# Patient Record
Sex: FEMALE | URBAN - METROPOLITAN AREA
[De-identification: names, ages, dates, MRNs, and addresses within clinical notes are randomized per-mention and may not be internally consistent; named-entity substitution may affect disease eponyms.]

---

## 2020-08-03 ENCOUNTER — APPOINTMENT (OUTPATIENT)
Dept: URBAN - METROPOLITAN AREA CLINIC 234 | Age: 75
Setting detail: DERMATOLOGY
End: 2020-08-03

## 2020-08-03 DIAGNOSIS — Z41.9 ENCOUNTER FOR PROCEDURE FOR PURPOSES OTHER THAN REMEDYING HEALTH STATE, UNSPECIFIED: ICD-10-CM

## 2020-08-03 PROCEDURE — OTHER TOPIX-PERFECT 10 PEEL: OTHER

## 2020-08-03 ASSESSMENT — LOCATION SIMPLE DESCRIPTION DERM: LOCATION SIMPLE: LEFT CHEEK

## 2020-08-03 ASSESSMENT — LOCATION DETAILED DESCRIPTION DERM: LOCATION DETAILED: LEFT INFERIOR CENTRAL MALAR CHEEK

## 2020-08-03 ASSESSMENT — LOCATION ZONE DERM: LOCATION ZONE: FACE

## 2020-08-03 NOTE — PROCEDURE: TOPIX-PERFECT 10 PEEL
Post-Care Instructions: I reviewed with the patient in detail post-care instructions. Patient should avoid sun exposure and wear sun protection.
Price (Use Numbers Only, No Special Characters Or $): 110
Treatment Number: 1
Consent: Prior to the procedure, written consent was obtained and risks were reviewed, including but not limited to: redness, peeling, blistering, pigmentary change, scarring, infection, and pain. Patient is aware multiple treatments may be necessary to achieve the desired outcome.
Post Peel Care: After the procedure, the patient was given post-peel instructions and all of their questions were answered.\\n\\nPrice $110. Pt scheduled another treatment 9/14/2020, depending on how well this Universal Peel went, we might bump up to the Intermediate next time. Gave sample of ointment, SPF, and Ceptaphil Gentle Cleanser. Suggested pt purschase Sign. Line  Antioxidant Moisturizer
Detail Level: Zone
Peel Type: Universal
Prep: The treated area was degreased with pre-peel cleanser, and vaseline was applied for protection of mucous membranes.

## 2020-08-03 NOTE — HPI: COSMETIC (CHEMICAL PEEL)
Have You Had A Chemical Peel Before?: has not had a previous peel
When Outside In The Sun, Do You...: mostly burns, rarely tans
Additional History: Pt is undergoing cancer treatments, but last treatment was ~9 weeks ago.